# Patient Record
Sex: MALE | Race: BLACK OR AFRICAN AMERICAN
[De-identification: names, ages, dates, MRNs, and addresses within clinical notes are randomized per-mention and may not be internally consistent; named-entity substitution may affect disease eponyms.]

---

## 2018-08-24 ENCOUNTER — HOSPITAL ENCOUNTER (EMERGENCY)
Dept: HOSPITAL 62 - ER | Age: 28
Discharge: HOME | End: 2018-08-24
Payer: SELF-PAY

## 2018-08-24 VITALS — SYSTOLIC BLOOD PRESSURE: 127 MMHG | DIASTOLIC BLOOD PRESSURE: 70 MMHG

## 2018-08-24 DIAGNOSIS — M79.641: Primary | ICD-10-CM

## 2018-08-24 DIAGNOSIS — F17.200: ICD-10-CM

## 2018-08-24 DIAGNOSIS — J45.909: ICD-10-CM

## 2018-08-24 DIAGNOSIS — R53.1: ICD-10-CM

## 2018-08-24 PROCEDURE — 99283 EMERGENCY DEPT VISIT LOW MDM: CPT

## 2018-08-24 NOTE — ER DOCUMENT REPORT
HPI





- HPI


Patient complains to provider of: Right hand pain


Onset: Other - 2 days


Onset/Duration: Persistent


Quality of pain: Achy


Pain Level: 2


Context: 





She presents complaining of right hand pain with decreased strength for the 

past 2 days.  Patient denies any new injury.  Patient denies any arm neck or 

upper back pain symptoms.  Patient does have a previous history of fractures 

involving the right hand that was never managed orthopedically.


Associated Symptoms: Other - Right hand pain and weakness


Exacerbated by: Movement


Relieved by: Denies


Similar symptoms previously: No


Recently seen / treated by doctor: No





- ROS


ROS below otherwise negative: Yes


Systems Reviewed and Negative: Yes All other systems reviewed and negative





- CONSTITUTIONAL


Constitutional: DENIES: Fever, Chills





- MUSCULOSKELETAL


Musculoskeletal: REPORTS: Extremity pain - Right hand.  DENIES: Back Pain, Neck 

Pain, Swelling





- DERM


Skin Color: Normal





Past Medical History





- General


Information source: Patient





- Social History


Smoking Status: Current Every Day Smoker


Chew tobacco use (# tins/day): No


Smoking Education Provided: Yes


Frequency of alcohol use: Occasional


Drug Abuse: None


Occupation: Welding


Lives with: Family


Family History: Reviewed & Not Pertinent


Patient has suicidal ideation: No


Patient has homicidal ideation: No


Pulmonary Medical History: Reports: Hx Asthma


Renal/ Medical History: Denies: Hx Peritoneal Dialysis


Surgical Hx: Negative





- Immunizations


Immunizations up to date: Yes


Hx Diphtheria, Pertussis, Tetanus Vaccination: Yes - 11/10/12





Vertical Provider Document





- CONSTITUTIONAL


Agree With Documented VS: Yes


Exam Limitations: No Limitations


General Appearance: WD/WN, No Apparent Distress





- INFECTION CONTROL


TRAVEL OUTSIDE OF THE [place] IN LAST 30 DAYS: No





- HEENT


HEENT: Atraumatic, Normocephalic





- NECK


Neck: Normal Inspection, Supple, Other - Cervical midline tenderness





- RESPIRATORY


Respiratory: Breath Sounds Normal, No Respiratory Distress





- CARDIOVASCULAR


Cardiovascular: Regular Rate, Regular Rhythm


Pulses: Normal: Radial





- BACK


Back: Normal Inspection





- MUSCULOSKELETAL/EXTREMETIES


Musculoskeletal/Extremeties: MAEW, FROM, Tender - Mild generalized tenderness 

to right hand, No Edema





- NEURO


Level of Consciousness: Awake, Alert, Appropriate


Motor/Sensory: No Motor Deficit, No Sensory Deficit


Notes: 





Patient with good  strength to bilateral hands 5/5, right  minimally 

weaker than the left





- DERM


Integumentary: Warm, Dry, No Rash





Course





- Re-evaluation


Re-evalutation: 





08/24/18 


Patient without any acute injury.  Patient has a history of previous fractures 

involving the hand that was not managed orthopedically.  Patient does report 

using the hand frequently when he is working as a .  Patient complains of 

decreased strength with flexing the right fifth finger.  Patient with good  

strength bilaterally.  Patient encouraged to follow-up with orthopedics for 

further evaluation of his symptoms.





- Vital Signs


Vital signs: 


 











Temp Pulse Resp BP Pulse Ox


 


 98.0 F   76   16   127/70 H  97 


 


 08/24/18 17:36  08/24/18 17:36  08/24/18 17:36  08/24/18 17:36  08/24/18 17:36














- Diagnostic Test


Radiology reviewed: Reports reviewed - Reviewed previous hand x-ray report.  

Patient with a history of right third and fourth metacarpal fractures





Discharge





- Discharge


Clinical Impression: 


 Right hand pain, Weakness of right hand, hx of healed fracture to right hand





Condition: Stable


Disposition: HOME, SELF-CARE


Additional Instructions: 


Return immediately for any new or worsening symptoms





Followup with your primary care provider, call tomorrow to make a followup 

appointment





Follow-up with orthopedic hand specialist for further evaluation


Prescriptions: 


Naproxen [Naprosyn 250 Nmg Tablet] 1 tab PO BID #14 tablet


Forms:  Smoking Cessation Education, Return to Work


Referrals: 


AVTAR WOODSON DO [ACTIVE STAFF] - Follow up as needed


[facility] [Provider Group] - Follow up as needed


[facility] [Provider Group] - Follow up as needed

## 2020-01-27 ENCOUNTER — HOSPITAL ENCOUNTER (EMERGENCY)
Dept: HOSPITAL 62 - ER | Age: 30
Discharge: HOME | End: 2020-01-27
Payer: COMMERCIAL

## 2020-01-27 VITALS — DIASTOLIC BLOOD PRESSURE: 108 MMHG | SYSTOLIC BLOOD PRESSURE: 151 MMHG

## 2020-01-27 DIAGNOSIS — G81.94: Primary | ICD-10-CM

## 2020-01-27 DIAGNOSIS — R53.1: ICD-10-CM

## 2020-01-27 DIAGNOSIS — R20.0: ICD-10-CM

## 2020-01-27 PROCEDURE — 99283 EMERGENCY DEPT VISIT LOW MDM: CPT

## 2020-01-27 PROCEDURE — L3908 WHO COCK-UP NONMOLDE PRE OTS: HCPCS

## 2020-01-27 PROCEDURE — 73110 X-RAY EXAM OF WRIST: CPT

## 2020-01-27 NOTE — ER DOCUMENT REPORT
ED Hand/Wrist Injury





- General


Chief Complaint: Weakness


Stated Complaint: HAND PAIN


Time Seen by Provider: 01/27/20 11:08


Mode of Arrival: Ambulatory


Notes: 





CHIEF COMPLAINT: Left wrist drop this morning





HPI: 1-year-old male who is right-hand dominant presenting to the emergency 

department for evaluation of left wrist drop this morning.  Patient states he 

was fine going to bed last night denies trauma denies fever denies other 

illness.  Patient states that he can flex at the wrist but cannot extend.  

Denies other injuries or complaints





ROS: See HPI - all other systems were reviewed and are otherwise negative


Constitutional: no fever 


Eyes: no drainage, no blurred vision


ENT: no runny nose, no sore throat


Cardiovascular:  no chest pain 


Resp: no SOB, no cough


GI: no vomiting, no diarrhea, no abdominal pain


: no dysuria


Integumentary: no rash 


Allergy: no hives 


Musculoskeletal: no extremity pain or swelling 


Neurological: + numbness/tingling, + weakness





MEDICATIONS: I agree with the patient medications as charted by the RN.





ALLERGIES: I agree with the allergies as charted by the RN.





PAST MEDICAL HISTORY/PAST SURGICAL HISTORY: Reviewed and agree as charted by RN.





SOCIAL HISTORY: Reviewed and agree as charted by RN.





FAMILY HISTORY: No significant familial comorbid conditions directly related to 

patient complaint





EXAM:


Reviewed vital signs as charted by RN.


CONSTITUTIONAL: Alert and oriented and responds appropriately to questions. 

Well-appearing; well-nourished


HEAD: Normocephalic; atraumatic


EYES: PERRL; Conjunctivae clear, sclerae non-icteric


ENT: normal nose; no rhinorrhea; moist mucous membranes


NECK: Supple without meningismus; non-tender; no cervical lymphadenopathy, no 

masses


CARD:  symmetric distal pulses


RESP: Normal chest excursion without splinting or tachypnea


ABD/GI:  non-distended.


BACK:  The back appears normal and is non-tender to palpation, there is no CVA 

tenderness


EXT: Patient is able to flex the left hand at the wrist but is unable to extend 

it passively or actively.  He does appear to have a left wrist drop.  When the 

hand is flexed down he is able to open and close the fingers and slightly abduct

the thumb but when the wrist is brought up to a 180 degree position with the 

forearm he is having significant difficulty opening and closing the fingers as 

well as abducting the thumb.  Patient has no strength loss in the left forearm 

on pushing or pulling.  He has no biceps or triceps weakness on exam   


SKIN: Normal color for age and race; warm; dry; good turgor; no acute lesions 

noted


NEURO:  sensory function intact 


PSYCH: The patient's mood and manner are appropriate. Grooming and personal 

hygiene are appropriate.





MDM: 29-year-old male who is right-hand dominant with Saturday night palsy left 

wrist.  Was fine going to bed last night.  No trauma.  X-ray negative.  

discussed with Dr. Hendrix, attending, will place in wrist splint refer to 

orthopedics


TRAVEL OUTSIDE OF THE [place] IN LAST 30 DAYS: No





- Related Data


Allergies/Adverse Reactions: 


                                        





No Known Allergies Allergy (Verified 04/11/16 14:57)


   











Past Medical History





- General


Information source: Patient





- Social History


Smoking Status: Unknown if Ever Smoked


Family History: Reviewed & Not Pertinent


Patient has suicidal ideation: No


Patient has homicidal ideation: No


Pulmonary Medical History: Reports: Hx Asthma


Renal/ Medical History: Denies: Hx Peritoneal Dialysis





- Immunizations


Immunizations up to date: Yes


Hx Diphtheria, Pertussis, Tetanus Vaccination: Yes - 11/10/12





Physical Exam





- Vital signs


Vitals: 


                                        











Temp Pulse Resp BP Pulse Ox


 


 97.7 F   70   16   161/103 H  100 


 


 01/27/20 10:24  01/27/20 10:24 01/27/20 10:24  01/27/20 10:24  01/27/20 10:24














Course





- Vital Signs


Vital signs: 


                                        











Temp Pulse Resp BP Pulse Ox


 


 97.7 F   70   16   161/103 H  100 


 


 01/27/20 10:24  01/27/20 10:24  01/27/20 10:24  01/27/20 10:24  01/27/20 10:24














Procedures





- Immobilization


  ** Left Distal Wrist


Time completed: 13:07


Pre-Proc Neuro Vasc Exam: Abnormal - Left wrist drop


Immobilizer type: Cock-up - Wrist


Performed by: PCT


Post-Proc Neuro Vasc Exam: Unchanged from pre-exam


Alignment checked and good: Yes





Discharge





- Discharge


Clinical Impression: 


 Saturday night paralysis of left upper extremity





Condition: Stable


Disposition: HOME, SELF-CARE


Additional Instructions: 


Use the wrist splint during the day as discussed.  Follow-up closely with 

orthopedics for further evaluation and management it is likely you will need a 

physical therapy referral to help recover function.  Call for appointment


Forms:  Return to Work


Referrals: 


AVTAR WOODSON,  [ACTIVE STAFF] - Follow up as needed

## 2020-01-27 NOTE — ER DOCUMENT REPORT
ED Medical Screen (RME)





- General


Chief Complaint: Hand Pain


Stated Complaint: HAND PAIN


Time Seen by Provider: 01/27/20 11:08


Mode of Arrival: Ambulatory


Information source: Patient


Notes: 





29-year-old male patient presented to the emergency department chief complaint 

of inability to flex or extend his left wrist.  He states he woke up like this. 

Denies any numbness or tingling.  States there is no pain.  Denies any trauma.  

Cap refill less than 3 seconds, strong radial pulse.  Normal motor and sensation

distal to area of concern.





I have greeted and performed a rapid initial assessment of this patient.  A 

comprehensive ED assessment and evaluation of the patient, analysis of test 

results and completion of the medical decision making process will be conducted 

by additional ED providers.  I have specifically instructed the patient or 

family members with the patient to immediately return to any nursing staff 

should anything change in the patient's condition or with their chief complaint.





TRAVEL OUTSIDE OF THE [place] IN LAST 30 DAYS: No





- Related Data


Allergies/Adverse Reactions: 


                                        





No Known Allergies Allergy (Verified 04/11/16 14:57)


   











Past Medical History


Pulmonary Medical History: Reports: Hx Asthma


Renal/ Medical History: Denies: Hx Peritoneal Dialysis





- Immunizations


Immunizations up to date: Yes


Hx Diphtheria, Pertussis, Tetanus Vaccination: Yes - 11/10/12





Physical Exam





- Vital signs


Vitals: 





                                        











Temp Pulse Resp BP Pulse Ox


 


 97.7 F   70   16   161/103 H  100 


 


 01/27/20 10:24  01/27/20 10:24  01/27/20 10:24 01/27/20 10:24 01/27/20 10:24














Course





- Vital Signs


Vital signs: 





                                        











Temp Pulse Resp BP Pulse Ox


 


 97.7 F   70   16   161/103 H  100 


 


 01/27/20 10:24  01/27/20 10:24  01/27/20 10:24  01/27/20 10:24  01/27/20 10:24

## 2020-01-27 NOTE — RADIOLOGY REPORT (SQ)
EXAM DESCRIPTION:  WRIST LEFT 3 VIEWS



COMPLETED DATE/TIME:  1/27/2020 11:43 am



REASON FOR STUDY:  unable to flex wrist, no trauma



COMPARISON:  None.



NUMBER OF VIEWS:  Three views.



TECHNIQUE:  AP, lateral, and oblique radiographic images acquired of the left wrist.



LIMITATIONS:  None.



FINDINGS:  MINERALIZATION: Normal.

BONES: No acute fracture or dislocation.  No worrisome bone lesions.  Normal alignment.

SOFT TISSUES: No soft tissue swelling.  No foreign body.

OTHER: No other significant finding.



IMPRESSION:  NEGATIVE STUDY OF THE LEFT WRIST. NO RADIOGRAPHIC EVIDENCE OF ACUTE INJURY.



TECHNICAL DOCUMENTATION:  JOB ID:  [id number]

 2011 [employer]- All Rights Reserved



Reading location - IP/workstation name: [email or web address]

## 2020-06-01 ENCOUNTER — HOSPITAL ENCOUNTER (OUTPATIENT)
Dept: HOSPITAL 62 - RAD | Age: 30
End: 2020-06-01
Attending: ORTHOPAEDIC SURGERY
Payer: COMMERCIAL

## 2020-06-01 DIAGNOSIS — M47.892: Primary | ICD-10-CM

## 2020-06-01 DIAGNOSIS — M54.2: ICD-10-CM

## 2020-06-01 PROCEDURE — 72141 MRI NECK SPINE W/O DYE: CPT

## 2020-06-01 NOTE — RADIOLOGY REPORT (SQ)
EXAM DESCRIPTION:  MRI CERVICAL SPINE WITHOUT



IMAGES COMPLETED DATE/TIME:  6/1/2020 4:04 pm



REASON FOR STUDY:  M54.2 CERVICALGIA M54.2  CERVICALGIA



COMPARISON:  None.



TECHNIQUE:  Sagittal and Axial imaging includes T1, T2, STIR and gradient echo sequences.



LIMITATIONS:  None.



FINDINGS:  ALIGNMENT: Normal.

VERTEBRAE: Intact.

BONE MARROW: Normal. No marrow replacement or reactive changes.

DISCS: Minimal disc related osteophytes particularly anteriorly at C4-5.

HARDWARE: None in the spine.

CORD AND BASE OF BRAIN: Normal in size and signal intensity.

SOFT TISSUES: No soft tissue masses.

C1-C2: No significant spinal stenosis.

C2-C3: No significant spinal stenosis or exit foraminal stenosis.

C3-C4: No significant spinal stenosis or exit foraminal stenosis.

C4-C5: No significant spinal stenosis or exit foraminal stenosis.

C5-C6: No significant spinal stenosis or exit foraminal stenosis.

C6-C7: No significant spinal stenosis or exit foraminal stenosis.

C7-T1: No significant spinal stenosis or exit foraminal stenosis.

UPPER THORACIC: Incompletely imaged. No significant spinal stenosis or exit foraminal stenosis.

OTHER: No other significant finding.



IMPRESSION:

1. Mild spondylosis but no suggestion of significant spinal stenosis.  No fracture or bone lesion or 
cord lesion or spinal malalignment.



TECHNICAL DOCUMENTATION:  JOB ID:  [id number]

 [employer]- All Rights Reserved



Reading location - IP/workstation name: [email or web address]